# Patient Record
Sex: MALE | URBAN - NONMETROPOLITAN AREA
[De-identification: names, ages, dates, MRNs, and addresses within clinical notes are randomized per-mention and may not be internally consistent; named-entity substitution may affect disease eponyms.]

---
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
## 2024-03-27 ENCOUNTER — OFFICE VISIT (OUTPATIENT)
Dept: GASTROENTEROLOGY | Facility: MEDICAL CENTER | Age: 19
End: 2024-03-27
Payer: COMMERCIAL

## 2024-03-27 VITALS
TEMPERATURE: 97.4 F | BODY MASS INDEX: 19.13 KG/M2 | SYSTOLIC BLOOD PRESSURE: 122 MMHG | WEIGHT: 114.8 LBS | HEIGHT: 65 IN | DIASTOLIC BLOOD PRESSURE: 68 MMHG | OXYGEN SATURATION: 97 % | HEART RATE: 68 BPM

## 2024-03-27 DIAGNOSIS — R10.11 RIGHT UPPER QUADRANT ABDOMINAL PAIN: Primary | ICD-10-CM

## 2024-03-27 PROCEDURE — 99204 OFFICE O/P NEW MOD 45 MIN: CPT | Performed by: STUDENT IN AN ORGANIZED HEALTH CARE EDUCATION/TRAINING PROGRAM

## 2024-03-27 RX ORDER — DICYCLOMINE HYDROCHLORIDE 10 MG/1
10 CAPSULE ORAL 4 TIMES DAILY PRN
Qty: 120 CAPSULE | Refills: 2 | Status: SHIPPED | OUTPATIENT
Start: 2024-03-27

## 2024-03-27 RX ORDER — FAMOTIDINE 20 MG/1
20 TABLET, FILM COATED ORAL 2 TIMES DAILY PRN
Qty: 60 TABLET | Refills: 2 | Status: SHIPPED | OUTPATIENT
Start: 2024-03-27

## 2024-03-27 NOTE — PROGRESS NOTES
[facility] - Outpatient Consultation  Lev Herrera 18 y.o. male MRN: [id number]  Encounter: [id number]          ASSESSMENT AND PLAN:    18-year-old male with ADHD and insomnia here for 3 months of abdominal/right upper quadrant discomfort.  He reports a outside workup with ultrasound showing gallstones.  His symptoms do not sound classic for biliary colic.  I am more suspicious for gastritis versus intestinal spasm/IBS versus constipation.  Will request ultrasound repeat and trial treatments.  1. Right upper quadrant abdominal pain  - dicyclomine (BENTYL) 10 mg capsule; Take 1 capsule (10 mg total) by mouth 4 (four) times a day as needed (abdominal pain)  Dispense: 120 capsule; Refill: 2  - famotidine (PEPCID) 20 mg tablet; Take 1 tablet (20 mg total) by mouth 2 (two) times a day as needed (abdominal pain)  Dispense: 60 tablet; Refill: 2  -Start powdered fiber supplement such as Metamucil or Benefiber daily to see if this helps regulate bowels  -Request ultrasound report from outside group    ______________________________________________________________________    HPI:    Patient was seen with his mother and grandfather who provided some history.    In December, got really sick with cough, vomiting, weakness, malaise. Since recovering from this, feels pretty normal.  Getting a cramp in the stomach, when it happens feels like knot in stomach that happens all day.   Improves with passing gas.  Not associated with certain foods or stress.  Gets heartburn less than weekly.   No nausea or vomiting.    Has BM 3 times a day, consistency varies soft or formed.  No blood in stool.    Saw [facility] and got US.  Told he had small gallstones    2/16/21 TTG IgA negative      REVIEW OF SYSTEMS:    CONSTITUTIONAL: Denies any fever, chills, rigors, and weight loss.  HEENT: No earache or tinnitus. Denies hearing loss or visual disturbances.  CARDIOVASCULAR: No chest pain or palpitations.  "  RESPIRATORY: Denies any cough, hemoptysis, shortness of breath or dyspnea on exertion.  GASTROINTESTINAL: As noted in the History of Present Illness.   GENITOURINARY: No problems with urination. Denies any hematuria or dysuria.  NEUROLOGIC: No dizziness or vertigo, denies headaches.   MUSCULOSKELETAL: Denies any muscle or joint pain.   SKIN: Denies skin rashes or itching.   ENDOCRINE: Denies excessive thirst. Denies intolerance to heat or cold.  PSYCHOSOCIAL: Denies depression or anxiety. Denies any recent memory loss.       Historical Information   History reviewed. No pertinent past medical history.  History reviewed. No pertinent surgical history.  Social History   Social History     Substance and Sexual Activity   Alcohol Use Never     Social History     Substance and Sexual Activity   Drug Use Never     Social History     Tobacco Use   Smoking Status Never   Smokeless Tobacco Never     History reviewed. No pertinent family history.    Meds/Allergies       Current Outpatient Medications:     Melatonin 1 MG CAPS    No Known Allergies        Objective     Blood pressure 122/68, pulse 68, temperature (!) 97.4 °F (36.3 °C), height 5' 5\" (1.651 m), weight 52.1 kg (114 lb 12.8 oz), SpO2 97%. Body mass index is 19.1 kg/m².        PHYSICAL EXAM:      General Appearance:   Alert, cooperative, no distress   HEENT:   Normocephalic, atraumatic, anicteric.     Neck:  Supple, symmetrical, trachea midline   Lungs:   Clear to auscultation bilaterally; no rales, rhonchi or wheezing; respirations unlabored    Heart::   Regular rate and rhythm; no murmur, rub, or gallop.   Abdomen:   Soft, non-tender, non-distended; normal bowel sounds; no masses, no organomegaly    Genitalia:   Deferred    Rectal:   Deferred    Extremities:  No cyanosis, clubbing or edema    Pulses:  2+ and symmetric    Skin:  No jaundice, rashes, or lesions    Lymph nodes:  No palpable cervical lymphadenopathy        Lab Results:   No visits with results " within 1 Day(s) from this visit.   Latest known visit with results is:   Office Visit on 09/19/2023   Component Date Value    N gonorrhoeae, DNA Probe 09/19/2023 Negative     Chlamydia trachomatis, D* 09/19/2023 Positive (A)          Radiology Results:   No results found.